# Patient Record
Sex: FEMALE | Race: WHITE | NOT HISPANIC OR LATINO | URBAN - NONMETROPOLITAN AREA
[De-identification: names, ages, dates, MRNs, and addresses within clinical notes are randomized per-mention and may not be internally consistent; named-entity substitution may affect disease eponyms.]

---

## 2024-09-20 ENCOUNTER — ANESTHESIA (OUTPATIENT)
Dept: GASTROENTEROLOGY | Facility: HOSPITAL | Age: 44
End: 2024-09-20
Payer: COMMERCIAL

## 2024-09-20 ENCOUNTER — ANESTHESIA EVENT (OUTPATIENT)
Dept: GASTROENTEROLOGY | Facility: HOSPITAL | Age: 44
End: 2024-09-20
Payer: COMMERCIAL

## 2024-09-20 ENCOUNTER — HOSPITAL ENCOUNTER (OUTPATIENT)
Dept: GASTROENTEROLOGY | Facility: HOSPITAL | Age: 44
Setting detail: OUTPATIENT SURGERY
End: 2024-09-20
Attending: HOSPITALIST
Payer: COMMERCIAL

## 2024-09-20 VITALS
TEMPERATURE: 97.2 F | OXYGEN SATURATION: 100 % | DIASTOLIC BLOOD PRESSURE: 65 MMHG | HEART RATE: 45 BPM | RESPIRATION RATE: 22 BRPM | SYSTOLIC BLOOD PRESSURE: 98 MMHG

## 2024-09-20 DIAGNOSIS — Z12.11 ENCOUNTER FOR SCREENING FOR MALIGNANT NEOPLASM OF COLON: ICD-10-CM

## 2024-09-20 RX ORDER — LIDOCAINE HYDROCHLORIDE 10 MG/ML
INJECTION, SOLUTION EPIDURAL; INFILTRATION; INTRACAUDAL; PERINEURAL AS NEEDED
Status: DISCONTINUED | OUTPATIENT
Start: 2024-09-20 | End: 2024-09-20

## 2024-09-20 RX ORDER — SODIUM CHLORIDE, SODIUM LACTATE, POTASSIUM CHLORIDE, CALCIUM CHLORIDE 600; 310; 30; 20 MG/100ML; MG/100ML; MG/100ML; MG/100ML
INJECTION, SOLUTION INTRAVENOUS CONTINUOUS PRN
Status: DISCONTINUED | OUTPATIENT
Start: 2024-09-20 | End: 2024-09-20

## 2024-09-20 RX ORDER — PROPOFOL 10 MG/ML
INJECTION, EMULSION INTRAVENOUS AS NEEDED
Status: DISCONTINUED | OUTPATIENT
Start: 2024-09-20 | End: 2024-09-20

## 2024-09-20 RX ADMIN — LIDOCAINE HYDROCHLORIDE 50 MG: 10 INJECTION, SOLUTION EPIDURAL; INFILTRATION; INTRACAUDAL; PERINEURAL at 08:04

## 2024-09-20 RX ADMIN — PROPOFOL 20 MG: 10 INJECTION, EMULSION INTRAVENOUS at 08:09

## 2024-09-20 RX ADMIN — PROPOFOL 70 MG: 10 INJECTION, EMULSION INTRAVENOUS at 08:04

## 2024-09-20 RX ADMIN — SODIUM CHLORIDE, SODIUM LACTATE, POTASSIUM CHLORIDE, AND CALCIUM CHLORIDE: .6; .31; .03; .02 INJECTION, SOLUTION INTRAVENOUS at 08:00

## 2024-09-20 RX ADMIN — PROPOFOL 120 MCG/KG/MIN: 10 INJECTION, EMULSION INTRAVENOUS at 08:06

## 2024-09-20 NOTE — ANESTHESIA PREPROCEDURE EVALUATION
Procedure:  COLONOSCOPY    Relevant Problems   No relevant active problems        Physical Exam    Airway    Mallampati score: II  TM Distance: >3 FB  Neck ROM: full     Dental   No notable dental hx     Cardiovascular      Pulmonary      Other Findings  post-pubertal.      Anesthesia Plan  ASA Score- 1     Anesthesia Type- IV sedation with anesthesia with ASA Monitors.         Additional Monitors:     Airway Plan:            Plan Factors-Exercise tolerance (METS): >4 METS.    Chart reviewed.        Patient is not a current smoker.              Induction-     Postoperative Plan-         Informed Consent- Anesthetic plan and risks discussed with patient.  I personally reviewed this patient with the CRNA. Discussed and agreed on the Anesthesia Plan with the CRNA..

## 2024-09-20 NOTE — ANESTHESIA POSTPROCEDURE EVALUATION
Post-Op Assessment Note    CV Status:  Stable  Pain Score: 0    Pain management: adequate       Mental Status:  Alert and awake   Hydration Status:  Stable   PONV Controlled:  Controlled   Airway Patency:  Patent     Post Op Vitals Reviewed: Yes    No anethesia notable event occurred.    Staff: CRNA               BP   95/55   Temp   97.9   Pulse  51   Resp   14   SpO2   100

## 2024-09-20 NOTE — H&P
Procedure(s):  Colonoscopy with indication(s) of CRC screening        Vitals:    09/20/24 0729   BP: 99/57   Pulse: (!) 48   Resp: 16   Temp: (!) 97.3 °F (36.3 °C)   SpO2: 100%       Physical Exam:  Physical Exam  Eyes:      Conjunctiva/sclera: Conjunctivae normal.   Cardiovascular:      Rate and Rhythm: Normal rate.   Pulmonary:      Effort: Pulmonary effort is normal.   Abdominal:      Palpations: Abdomen is soft.   Neurological:      General: No focal deficit present.      Mental Status: She is alert.   Psychiatric:         Mood and Affect: Mood normal.              Endoscopy Pre-Procedure Assessment:  Prior to the procedure, the patient is identified.  The patient's history, medications, and allergies have been reviewed.  The patient is competent.     Consent:    We have discussed the procedure in detail. We reviewed risks, benefits and alternative as well as potentional complications including and not limited to medication side effect , infection, bleeding, perforation and the potential need for surgery, ICU admission, CPR, as well as the need for blood product transfusion. Patient verbalized understanding and agreement. All patient questions were answered.     After reviewed the risks and benefits, the patient is deemed in satisfactory condition to undergo the procedure.  The anesthesia plan is to use monitored anesthesia care (MAC).      09/20/24

## 2025-02-03 ENCOUNTER — OFFICE VISIT (OUTPATIENT)
Dept: OBGYN CLINIC | Facility: CLINIC | Age: 45
End: 2025-02-03
Payer: COMMERCIAL

## 2025-02-03 VITALS
DIASTOLIC BLOOD PRESSURE: 64 MMHG | SYSTOLIC BLOOD PRESSURE: 90 MMHG | HEART RATE: 62 BPM | WEIGHT: 89.6 LBS | BODY MASS INDEX: 16.93 KG/M2

## 2025-02-03 DIAGNOSIS — M81.0 OSTEOPOROSIS OF MULTIPLE SITES: ICD-10-CM

## 2025-02-03 DIAGNOSIS — Z01.419 ENCNTR FOR GYN EXAM (GENERAL) (ROUTINE) W/O ABN FINDINGS: Primary | ICD-10-CM

## 2025-02-03 DIAGNOSIS — Z86.19 HISTORY OF HPV INFECTION: ICD-10-CM

## 2025-02-03 DIAGNOSIS — Z00.00 WELLNESS EXAMINATION: ICD-10-CM

## 2025-02-03 DIAGNOSIS — Z12.4 CERVICAL CANCER SCREENING: ICD-10-CM

## 2025-02-03 DIAGNOSIS — N91.2 AMENORRHEA: ICD-10-CM

## 2025-02-03 DIAGNOSIS — Z87.42 H/O ABNORMAL CERVICAL PAPANICOLAOU SMEAR: ICD-10-CM

## 2025-02-03 DIAGNOSIS — Z12.31 ENCOUNTER FOR SCREENING MAMMOGRAM FOR BREAST CANCER: ICD-10-CM

## 2025-02-03 PROCEDURE — S0610 ANNUAL GYNECOLOGICAL EXAMINA: HCPCS | Performed by: OBSTETRICS & GYNECOLOGY

## 2025-02-03 PROCEDURE — G0145 SCR C/V CYTO,THINLAYER,RESCR: HCPCS | Performed by: OBSTETRICS & GYNECOLOGY

## 2025-02-03 PROCEDURE — G0476 HPV COMBO ASSAY CA SCREEN: HCPCS | Performed by: OBSTETRICS & GYNECOLOGY

## 2025-02-03 NOTE — PROGRESS NOTES
Assessment        Diagnoses and all orders for this visit:    Encntr for gyn exam (general) (routine) w/o abn findings    Cervical cancer screening  -     Liquid-based pap, screening    H/O abnormal cervical Papanicolaou smear  -     Liquid-based pap, screening    Osteoporosis of multiple sites  -     Ambulatory Referral to Endocrinology; Future  -     PTH, intact; Future  -     Comprehensive metabolic panel; Future  -     Vitamin D 25 hydroxy; Future    History of HPV infection  -     Liquid-based pap, screening    Encounter for screening mammogram for breast cancer  -     Mammo screening bilateral w 3d and cad; Future    Amenorrhea  -     Follicle stimulating hormone; Future  -     Prolactin; Future  -     hCG, quantitative; Future  -     Estradiol; Future  -     TSH, 3rd generation with Free T4 reflex; Future    Wellness examination  -     Ambulatory Referral to Family Practice; Future             Plan      All questions answered.  Await pap smear results.  Diagnosis explained in detail, including differential.  Discussed healthy lifestyle modifications.  Educational material distributed.  Follow up in 1 year.  Follow up as needed.  Mammogram.   Pap Smear performed due to history of HPV Pap  Referred to endocrinology due to early diagnosis of osteoporosis  Patient ordered blood work, intact PTH, CMP and vitamin D was ordered  Referred to family practice to establish care  Patient with amenorrhea and repeat labs are ordered including FSH, prolactin, estradiol and TSH      Subjective      Rima Bajwa is a 44 y.o. female who presents for annual exam.      Chief Complaint   Patient presents with    new Pt. annual     Diagnosed with Osteoporosis. Pt. has questions     She is a new patient  Last appt last January 2024 Dr. Yenifer Hensley/[facility]  Last year, had an abnormal pap 2 years ago  Previously had a colposcopy  H/o HPV positive other    1/31/23 negative colposcopy    Last period 2 years  FSH was  normal last year    Patient with issued with getting pregnant  Sheis allergic to milk, patient had short stature  Was told she needed hormone drops for osteoporosis    She is not having hot flashes  She has no vaginal dryness  Had an US at some point   No family h/o early menopause      No recent blood work  Does have a PCP      Last Pap: normal 24 negative HPV  Last mammogram: 2024  Colorectal cancer screening: Not on file 2024 , due in 10 years  DEXA: osteoporosis 24 , multiple sites    HPV vaccine completed:no   Current contraception: vasectomy  History of abnormal Pap smear: yes - HPV positive other   History of abnormal mammogram: no  Family history of uterine or ovarian cancer: no  Family history of breast cancer: no  Family history of colon cancer: no    OB History    Para Term  AB Living   3 1 0 1 2 1   SAB IAB Ectopic Multiple Live Births   1 0 1 0 1      # Outcome Date GA Lbr John/2nd Weight Sex Type Anes PTL Lv   3   32w3d  1588 g (3 lb 8 oz) M Vag-Spont EPI  RONALD   2 SAB            1 Ectopic                Menstrual History:  OB History          3    Para   1    Term   0       1    AB   2    Living   1         SAB   1    IAB   0    Ectopic   1    Multiple   0    Live Births   1                Menarche age: 11  No LMP recorded. Patient is perimenopausal.       Social History     Substance and Sexual Activity   Sexual Activity Not on file          No past medical history on file.  Past Surgical History:   Procedure Laterality Date    DILATION AND CURETTAGE OF UTERUS      ECTOPIC PREGNANCY SURGERY      WISDOM TOOTH EXTRACTION       Family History   Problem Relation Age of Onset    Alzheimer's disease Mother        Social History     Tobacco Use    Smoking status: Never    Smokeless tobacco: Never   Vaping Use    Vaping status: Never Used   Substance Use Topics    Alcohol use: Yes     Comment: social    Drug use: Never          Current Outpatient  Medications:     Calcium Carb-Cholecalciferol (CALCIUM 500 + D PO), Take by mouth, Disp: , Rfl:     No Known Allergies        Review of Systems   Constitutional: Negative.    HENT: Negative.     Eyes: Negative.    Respiratory: Negative.     Cardiovascular: Negative.    Gastrointestinal: Negative.    Endocrine: Negative.    Genitourinary:         As noted in HPI   Musculoskeletal: Negative.    Skin: Negative.    Allergic/Immunologic: Negative.    Neurological: Negative.    Hematological: Negative.    Psychiatric/Behavioral: Negative.         BP 90/64 (BP Location: Right arm, Patient Position: Sitting, Cuff Size: Standard)   Pulse 62   Wt 40.6 kg (89 lb 9.6 oz)   BMI 16.93 kg/m²         Physical Exam  Constitutional:       Appearance: She is well-developed.   Genitourinary:      Vulva, bladder and rectum normal.      No lesions in the vagina.      Genitourinary Comments:         Right Labia: No rash, tenderness, lesions, skin changes or Bartholin's cyst.     Left Labia: No tenderness, lesions, skin changes, Bartholin's cyst or rash.     No inguinal adenopathy present in the right or left side.     No vaginal discharge, tenderness or bleeding.      No vaginal prolapse present.     No vaginal atrophy present.       Right Adnexa: not tender, not full and no mass present.     Left Adnexa: not tender, not full and no mass present.     No cervical motion tenderness, friability, lesion or polyp.      Uterus is not enlarged or tender.      Pelvic exam was performed with patient in the lithotomy position.   Rectum:      No external hemorrhoid.   Breasts:     Right: No mass, nipple discharge, skin change or tenderness.      Left: No mass, nipple discharge, skin change or tenderness.   HENT:      Head: Normocephalic.      Nose: Nose normal.   Eyes:      Conjunctiva/sclera: Conjunctivae normal.   Neck:      Thyroid: No thyromegaly.   Cardiovascular:      Rate and Rhythm: Normal rate and regular rhythm.      Heart sounds:  Normal heart sounds. No murmur heard.  Pulmonary:      Effort: Pulmonary effort is normal. No respiratory distress.      Breath sounds: Normal breath sounds. No wheezing or rales.   Abdominal:      General: There is no distension.      Palpations: Abdomen is soft. There is no mass.      Tenderness: There is no abdominal tenderness. There is no guarding or rebound.   Musculoskeletal:         General: No tenderness.      Cervical back: Neck supple. No muscular tenderness.   Lymphadenopathy:      Cervical: No cervical adenopathy.      Lower Body: No right inguinal adenopathy. No left inguinal adenopathy.   Neurological:      Mental Status: She is alert and oriented to person, place, and time.   Skin:     General: Skin is warm and dry.   Psychiatric:         Mood and Affect: Mood normal.         Behavior: Behavior normal.   Vitals and nursing note reviewed. Exam conducted with a chaperone present.           No future appointments.

## 2025-02-04 LAB
HPV HR 12 DNA CVX QL NAA+PROBE: NEGATIVE
HPV16 DNA CVX QL NAA+PROBE: NEGATIVE
HPV18 DNA CVX QL NAA+PROBE: NEGATIVE

## 2025-02-05 ENCOUNTER — RESULTS FOLLOW-UP (OUTPATIENT)
Dept: LABOR AND DELIVERY | Facility: HOSPITAL | Age: 45
End: 2025-02-05

## 2025-02-05 NOTE — TELEPHONE ENCOUNTER
Patient called back, notified of providers note HPV. Patient verbalzied understanding, thankful for call. Patient will be going for lab work this week.

## 2025-02-06 LAB
LAB AP GYN PRIMARY INTERPRETATION: NORMAL
Lab: NORMAL

## 2025-02-11 ENCOUNTER — APPOINTMENT (OUTPATIENT)
Dept: LAB | Facility: CLINIC | Age: 45
End: 2025-02-11
Payer: COMMERCIAL

## 2025-02-11 DIAGNOSIS — N91.2 AMENORRHEA: ICD-10-CM

## 2025-02-11 DIAGNOSIS — M81.0 OSTEOPOROSIS OF MULTIPLE SITES: ICD-10-CM

## 2025-02-11 LAB
25(OH)D3 SERPL-MCNC: 40.9 NG/ML (ref 30–100)
ALBUMIN SERPL BCG-MCNC: 4.3 G/DL (ref 3.5–5)
ALP SERPL-CCNC: 60 U/L (ref 34–104)
ALT SERPL W P-5'-P-CCNC: 23 U/L (ref 7–52)
ANION GAP SERPL CALCULATED.3IONS-SCNC: 8 MMOL/L (ref 4–13)
AST SERPL W P-5'-P-CCNC: 28 U/L (ref 13–39)
B-HCG SERPL-ACNC: <0.6 MIU/ML (ref 0–5)
BILIRUB SERPL-MCNC: 1.2 MG/DL (ref 0.2–1)
BUN SERPL-MCNC: 13 MG/DL (ref 5–25)
CALCIUM SERPL-MCNC: 9.4 MG/DL (ref 8.4–10.2)
CHLORIDE SERPL-SCNC: 100 MMOL/L (ref 96–108)
CO2 SERPL-SCNC: 31 MMOL/L (ref 21–32)
CREAT SERPL-MCNC: 0.62 MG/DL (ref 0.6–1.3)
ESTRADIOL SERPL-MCNC: 36.7 PG/ML
FSH SERPL-ACNC: 3.3 MIU/ML
GFR SERPL CREATININE-BSD FRML MDRD: 109 ML/MIN/1.73SQ M
GLUCOSE P FAST SERPL-MCNC: 86 MG/DL (ref 65–99)
POTASSIUM SERPL-SCNC: 4 MMOL/L (ref 3.5–5.3)
PROLACTIN SERPL-MCNC: 9.6 NG/ML (ref 3.34–26.72)
PROT SERPL-MCNC: 6.4 G/DL (ref 6.4–8.4)
PTH-INTACT SERPL-MCNC: 25.7 PG/ML (ref 12–88)
SODIUM SERPL-SCNC: 139 MMOL/L (ref 135–147)
TSH SERPL DL<=0.05 MIU/L-ACNC: 1.54 UIU/ML (ref 0.45–4.5)

## 2025-02-11 PROCEDURE — 84146 ASSAY OF PROLACTIN: CPT

## 2025-02-11 PROCEDURE — 82670 ASSAY OF TOTAL ESTRADIOL: CPT

## 2025-02-11 PROCEDURE — 80053 COMPREHEN METABOLIC PANEL: CPT

## 2025-02-11 PROCEDURE — 83970 ASSAY OF PARATHORMONE: CPT

## 2025-02-11 PROCEDURE — 83001 ASSAY OF GONADOTROPIN (FSH): CPT

## 2025-02-11 PROCEDURE — 84443 ASSAY THYROID STIM HORMONE: CPT

## 2025-02-11 PROCEDURE — 36415 COLL VENOUS BLD VENIPUNCTURE: CPT

## 2025-02-11 PROCEDURE — 82306 VITAMIN D 25 HYDROXY: CPT

## 2025-02-11 PROCEDURE — 84702 CHORIONIC GONADOTROPIN TEST: CPT

## 2025-03-27 ENCOUNTER — CONSULT (OUTPATIENT)
Dept: ENDOCRINOLOGY | Facility: CLINIC | Age: 45
End: 2025-03-27
Payer: COMMERCIAL

## 2025-03-27 VITALS
TEMPERATURE: 98 F | BODY MASS INDEX: 16.8 KG/M2 | HEIGHT: 61 IN | DIASTOLIC BLOOD PRESSURE: 70 MMHG | WEIGHT: 89 LBS | OXYGEN SATURATION: 98 % | HEART RATE: 56 BPM | SYSTOLIC BLOOD PRESSURE: 98 MMHG

## 2025-03-27 DIAGNOSIS — R63.6 UNDERWEIGHT: ICD-10-CM

## 2025-03-27 DIAGNOSIS — M81.0 OSTEOPOROSIS OF MULTIPLE SITES: Primary | ICD-10-CM

## 2025-03-27 PROCEDURE — 99244 OFF/OP CNSLTJ NEW/EST MOD 40: CPT | Performed by: STUDENT IN AN ORGANIZED HEALTH CARE EDUCATION/TRAINING PROGRAM

## 2025-03-27 NOTE — PATIENT INSTRUCTIONS
INSTRUCTIONS FOR 24 HOUR URINE COLLECTION     The purpose of this test is to measure the total amount of either hormones or minerals that your body excretes into your urine within a 24 hour period.  To do this, you need to collect all the urine that your body makes for 24 hours.     1.        On the first day, when you wake up, note the time.  Then go to the bathroom and urinate.  This should be flushed down the toilet.  It is not part of the collection.     2.        On the first day, all subsequent urine voids need to be saved in the urine jug.  Save the entire amount of each urine void.     3.        When you go to sleep that night, if you happen to awaken throughout the night and early morning, those urine voids must also be saved.     4.        On the second morning, awaken at the same time as you did on the first morning and go to the bathroom and urinate.  Keep this entire urine void.  This is the final part of the collection.       5.        After you have finished the urine collection, keep it cool until you bring it into the laboratory.      Osteoporosis Education   Osteoporosis  is a long-term medical condition that causes your bones to become weak, brittle, and more likely to fracture. Osteoporosis occurs when your body absorbs more bone than it makes. It is also caused by a lack of calcium and estrogen (female hormone).  Common symptoms include the following:  You may not have any signs or symptoms. You may break a bone after a muscle strain, bump, or fall. A break usually occurs in the hip, spine, or wrist. A collapsed vertebra (bone in your spine) may cause severe back pain or loss of height from bent posture.  Call your doctor if:    You have severe pain.   You have increasing pain after a fall.   You have pain when you do your daily activities.   You have questions or concerns about your condition or care.  Diagnosis of osteoporosis:   Blood and urine tests  measure your calcium, vitamin D, and  estrogen levels.    An x-ray or CT may show thinned bones or a fracture. You may be given contrast liquid to help the bones show up better in the pictures. Tell the healthcare provider if you have ever had an allergic reaction to contrast liquid. Do not enter the MRI room with anything metal. Metal can cause serious injury. Tell the healthcare provider if you have any metal in or on your body.    A bone density test  compares your bone thickness with what is expected for someone of your age, gender, and ethnicity.  Treatment for osteoporosis may include medicines to prevent bone loss, build new bone, and increase estrogen. These medicines help prevent fractures and may be given as a pill or injection. Ask your healthcare provider for more information on these medicines.  Prevent bone loss:  Eat healthy foods that are high in calcium.  This helps keep your bones strong. Good sources of calcium are milk, cheese, broccoli, tofu, almonds, and canned salmon and sardines. Recommended to get at least 1200mg daily of calcium.  Increase your vitamin D intake.  Vitamin D is in fish oils, some vegetables, and fortified milk, cereal, and bread. Vitamin D is also formed in the skin when it is exposed to the sun. Ask your healthcare provider how much sunlight is safe for you. You will require at least 800 units of vitamin D daily taken as a supplement.  Drink liquids as directed.  Ask your healthcare provider how much liquid to drink each day and which liquids are best for you. Do not have alcohol or caffeine. They decrease bone mineral density, which can weaken your bones.  Exercise regularly.  Ask your healthcare provider about the best exercise plan for you. Weight bearing exercise for 30 minutes, 3 times a week can help build and strengthen bone.  Do not smoke.  Nicotine and other chemicals in cigarettes and cigars can cause lung damage. Ask your healthcare provider for information if you currently smoke and need help to  quit. E-cigarettes or smokeless tobacco still contain nicotine. Talk to your healthcare provider before you use these products.  Go to physical therapy as directed.  A physical therapist teaches you exercises to help improve movement and muscle strength.  Alcohol. It is recommended to avoid heavy alcohol use as increased consumption of alcohol is known to cause bone loss  © Copyright [employer] 2021 Information is for End User's use only and may not be sold, redistributed or otherwise used for commercial purposes. All illustrations and images included in CareNotes® are the copyrighted property of [employer] or [employer]
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
## 2025-03-27 NOTE — ASSESSMENT & PLAN NOTE
Her T-score is -3.2, indicating severe osteoporosis, particularly in the lumbar spine. 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 Vitamin D levels are sufficient. Reproductive labs show low estradiol and FSH levels, suggesting potential hypogonadism. Given her history of irregular periods, she may have experienced significant loss of estrogenic effects on bone health. Weight-bearing activities such as resistance training are encouraged to build musculature around the bones for stability and fall prevention. A referral to a nutritionist was discussed with goal for addressing caloric imbalance. A 24-hour urine collection will be ordered to exclude potential etiologies for calcium loss or wasting, including cortisol levels. Additional labs will be requested to rule out other causes of bone density loss, including celiac disease. If any questions arise before the next appointment, she is advised to contact the office.    Follow-up  The patient will follow up in 1 month.      Orders:  •  Ambulatory Referral to Endocrinology  •  Cortisol, Free, Urine, 24 Hour  •  Creatinine, urine, 24 hour  •  Calcium, urine, 24 hour  •  CBC and differential  •  Comprehensive metabolic panel  •  C-Telopeptide  •  Procollagen Type I Intact N Terminal Propeptide  •  Celiac Panel/Adult  •  Protein electrophoresis, serum  •  Phosphorus  •  Protein electrophoresis, urine (UPEP)

## 2025-04-03 ENCOUNTER — TELEPHONE (OUTPATIENT)
Age: 45
End: 2025-04-03

## 2025-04-03 NOTE — TELEPHONE ENCOUNTER
Patient asking if 24 hour urine should be refrigerated. Made aware yes keep in the refrigerator until taking to lab. Patient verbalized understanding.

## 2025-04-05 LAB
ALBUMIN SERPL-MCNC: 4.5 G/DL (ref 3.6–5.1)
ALBUMIN/GLOB SERPL: 2.4 (CALC) (ref 1–2.5)
ALP SERPL-CCNC: 56 U/L (ref 31–125)
ALT SERPL-CCNC: 27 U/L (ref 6–29)
AST SERPL-CCNC: 26 U/L (ref 10–35)
BASOPHILS # BLD AUTO: 10 CELLS/UL (ref 0–200)
BASOPHILS NFR BLD AUTO: 0.4 %
BILIRUB SERPL-MCNC: 1.1 MG/DL (ref 0.2–1.2)
BUN SERPL-MCNC: 17 MG/DL (ref 7–25)
BUN/CREAT SERPL: NORMAL (CALC) (ref 6–22)
CALCIUM SERPL-MCNC: 9.2 MG/DL (ref 8.6–10.2)
CHLORIDE SERPL-SCNC: 107 MMOL/L (ref 98–110)
CO2 SERPL-SCNC: 30 MMOL/L (ref 20–32)
CREAT 24H UR-MRATE: 0.68 G/24 H (ref 0.5–2.15)
CREAT SERPL-MCNC: 0.68 MG/DL (ref 0.5–0.99)
EOSINOPHIL # BLD AUTO: 41 CELLS/UL (ref 15–500)
EOSINOPHIL NFR BLD AUTO: 1.7 %
ERYTHROCYTE [DISTWIDTH] IN BLOOD BY AUTOMATED COUNT: 12 % (ref 11–15)
GFR/BSA.PRED SERPLBLD CYS-BASED-ARV: 109 ML/MIN/1.73M2
GLOBULIN SER CALC-MCNC: 1.9 G/DL (CALC) (ref 1.9–3.7)
GLUCOSE SERPL-MCNC: 81 MG/DL (ref 65–99)
HCT VFR BLD AUTO: 35.4 % (ref 35–45)
HGB BLD-MCNC: 12.3 G/DL (ref 11.7–15.5)
IGA SERPL-MCNC: 183 MG/DL (ref 47–310)
IGA SERPL-MCNC: 184 MG/DL (ref 47–310)
LYMPHOCYTES # BLD AUTO: 794 CELLS/UL (ref 850–3900)
LYMPHOCYTES NFR BLD AUTO: 33.1 %
MCH RBC QN AUTO: 33.9 PG (ref 27–33)
MCHC RBC AUTO-ENTMCNC: 34.7 G/DL (ref 32–36)
MCV RBC AUTO: 97.5 FL (ref 80–100)
MONOCYTES # BLD AUTO: 221 CELLS/UL (ref 200–950)
MONOCYTES NFR BLD AUTO: 9.2 %
NEUTROPHILS # BLD AUTO: 1334 CELLS/UL (ref 1500–7800)
NEUTROPHILS NFR BLD AUTO: 55.6 %
PHOSPHATE SERPL-MCNC: 3.8 MG/DL (ref 2.5–4.5)
PLATELET # BLD AUTO: 145 THOUSAND/UL (ref 140–400)
PMV BLD REES-ECKER: 11.4 FL (ref 7.5–12.5)
POTASSIUM SERPL-SCNC: 4.7 MMOL/L (ref 3.5–5.3)
PROT 24H UR-MRATE: NORMAL MG/24 H
PROT SERPL-MCNC: 6.4 G/DL (ref 6.1–8.1)
PROT SERPL-MCNC: 6.4 G/DL (ref 6.1–8.1)
PROT/CREAT 24H UR: NORMAL MG/G CREAT
PROT/CREAT 24H UR: NORMAL MG/MG CREAT
RBC # BLD AUTO: 3.63 MILLION/UL (ref 3.8–5.1)
SODIUM SERPL-SCNC: 139 MMOL/L (ref 135–146)
TTG IGA SER-ACNC: <1 U/ML
TTG IGA SER-ACNC: <1 U/ML
WBC # BLD AUTO: 2.4 THOUSAND/UL (ref 3.8–10.8)

## 2025-04-08 ENCOUNTER — RESULTS FOLLOW-UP (OUTPATIENT)
Dept: ENDOCRINOLOGY | Facility: CLINIC | Age: 45
End: 2025-04-08

## 2025-04-08 DIAGNOSIS — E24.9 HYPERCORTISOLISM (HCC): Primary | ICD-10-CM

## 2025-04-09 LAB
ALBUMIN ?TM MFR UR ELPH: 0 %
ALBUMIN SERPL ELPH-MCNC: 4.3 G/DL (ref 3.8–4.8)
ALBUMIN SERPL-MCNC: 4.5 G/DL (ref 3.6–5.1)
ALBUMIN/GLOB SERPL: 2.4 (CALC) (ref 1–2.5)
ALP SERPL-CCNC: 56 U/L (ref 31–125)
ALPHA1 GLOB ?TM MFR UR ELPH: 0 %
ALPHA1 GLOB SERPL ELPH-MCNC: 0.2 G/DL (ref 0.2–0.3)
ALPHA2 GLOB ?TM MFR UR ELPH: 0 %
ALPHA2 GLOB SERPL ELPH-MCNC: 0.5 G/DL (ref 0.5–0.9)
ALT SERPL-CCNC: 27 U/L (ref 6–29)
AST SERPL-CCNC: 26 U/L (ref 10–35)
B-GLOBULIN ?TM MFR UR ELPH: 0 %
BASOPHILS # BLD AUTO: 10 CELLS/UL (ref 0–200)
BASOPHILS NFR BLD AUTO: 0.4 %
BETA1 GLOB SERPL ELPH-MCNC: 0.4 G/DL (ref 0.4–0.6)
BETA2 GLOB SERPL ELPH-MCNC: 0.3 G/DL (ref 0.2–0.5)
BILIRUB SERPL-MCNC: 1.1 MG/DL (ref 0.2–1.2)
BUN SERPL-MCNC: 17 MG/DL (ref 7–25)
BUN/CREAT SERPL: NORMAL (CALC) (ref 6–22)
CALCIUM 24H UR-MCNC: 276 MG/24 H (ref 35–250)
CALCIUM PRE 500 MG CA PO UR-SCNC: 10.6 MG/DL
CALCIUM SERPL-MCNC: 9.2 MG/DL (ref 8.6–10.2)
CHLORIDE SERPL-SCNC: 107 MMOL/L (ref 98–110)
CO2 SERPL-SCNC: 30 MMOL/L (ref 20–32)
COLLAGEN CTX SERPL-MCNC: 485 PG/ML (ref 50–465)
CORTIS F 24H UR-MRATE: 61.8 MCG/24 H (ref 4–50)
CORTIS F/CREAT 24H UR: 92.3 MCG/G CREAT
CREAT 24H UR-MRATE: 0.67 G/24 H (ref 0.5–2.15)
CREAT 24H UR-MRATE: 0.68 G/24 H (ref 0.5–2.15)
CREAT SERPL-MCNC: 0.68 MG/DL (ref 0.5–0.99)
EOSINOPHIL # BLD AUTO: 41 CELLS/UL (ref 15–500)
EOSINOPHIL NFR BLD AUTO: 1.7 %
ERYTHROCYTE [DISTWIDTH] IN BLOOD BY AUTOMATED COUNT: 12 % (ref 11–15)
GAMMA GLOB ?TM MFR UR ELPH: 0 %
GAMMA GLOB SERPL ELPH-MCNC: 0.8 G/DL (ref 0.8–1.7)
GFR/BSA.PRED SERPLBLD CYS-BASED-ARV: 109 ML/MIN/1.73M2
GLOBULIN SER CALC-MCNC: 1.9 G/DL (CALC) (ref 1.9–3.7)
GLUCOSE SERPL-MCNC: 81 MG/DL (ref 65–99)
HCT VFR BLD AUTO: 35.4 % (ref 35–45)
HGB BLD-MCNC: 12.3 G/DL (ref 11.7–15.5)
IGA SERPL-MCNC: 183 MG/DL (ref 47–310)
LYMPHOCYTES # BLD AUTO: 794 CELLS/UL (ref 850–3900)
LYMPHOCYTES NFR BLD AUTO: 33.1 %
MCH RBC QN AUTO: 33.9 PG (ref 27–33)
MCHC RBC AUTO-ENTMCNC: 34.7 G/DL (ref 32–36)
MCV RBC AUTO: 97.5 FL (ref 80–100)
MONOCYTES # BLD AUTO: 221 CELLS/UL (ref 200–950)
MONOCYTES NFR BLD AUTO: 9.2 %
NEUTROPHILS # BLD AUTO: 1334 CELLS/UL (ref 1500–7800)
NEUTROPHILS NFR BLD AUTO: 55.6 %
PHOSPHATE SERPL-MCNC: 3.8 MG/DL (ref 2.5–4.5)
PINP SER-MCNC: 49 MCG/L (ref 22–104)
PLATELET # BLD AUTO: 145 THOUSAND/UL (ref 140–400)
PMV BLD REES-ECKER: 11.4 FL (ref 7.5–12.5)
POTASSIUM SERPL-SCNC: 4.7 MMOL/L (ref 3.5–5.3)
PROT 24H UR-MRATE: NORMAL MG/24 H
PROT SERPL-MCNC: 6.4 G/DL (ref 6.1–8.1)
PROT SERPL-MCNC: 6.4 G/DL (ref 6.1–8.1)
PROT/CREAT 24H UR: NORMAL MG/G CREAT
PROT/CREAT 24H UR: NORMAL MG/MG CREAT
RBC # BLD AUTO: 3.63 MILLION/UL (ref 3.8–5.1)
SL AMB INTERPRETATION: NORMAL
SODIUM SERPL-SCNC: 139 MMOL/L (ref 135–146)
SPECIMEN VOL 24H UR: 2600 ML
SPECIMEN VOL 24H UR: 2600 ML
TTG IGA SER-ACNC: <1 U/ML
WBC # BLD AUTO: 2.4 THOUSAND/UL (ref 3.8–10.8)

## 2025-04-11 RX ORDER — DEXAMETHASONE 1 MG
1 TABLET ORAL ONCE
Qty: 1 TABLET | Refills: 0 | Status: SHIPPED | OUTPATIENT
Start: 2025-04-11 | End: 2025-04-11

## 2025-05-08 ENCOUNTER — OFFICE VISIT (OUTPATIENT)
Dept: ENDOCRINOLOGY | Facility: CLINIC | Age: 45
End: 2025-05-08
Payer: COMMERCIAL

## 2025-05-08 VITALS
TEMPERATURE: 97 F | HEIGHT: 61 IN | WEIGHT: 87.2 LBS | BODY MASS INDEX: 16.46 KG/M2 | SYSTOLIC BLOOD PRESSURE: 90 MMHG | OXYGEN SATURATION: 99 % | HEART RATE: 69 BPM | DIASTOLIC BLOOD PRESSURE: 70 MMHG

## 2025-05-08 DIAGNOSIS — M81.0 OSTEOPOROSIS OF MULTIPLE SITES: Primary | ICD-10-CM

## 2025-05-08 DIAGNOSIS — R63.6 UNDERWEIGHT: ICD-10-CM

## 2025-05-08 DIAGNOSIS — E24.9 HYPERCORTISOLISM (HCC): ICD-10-CM

## 2025-05-08 DIAGNOSIS — D70.9 NEUTROPENIA, UNSPECIFIED TYPE (HCC): ICD-10-CM

## 2025-05-08 PROCEDURE — 99214 OFFICE O/P EST MOD 30 MIN: CPT | Performed by: STUDENT IN AN ORGANIZED HEALTH CARE EDUCATION/TRAINING PROGRAM

## 2025-05-08 RX ORDER — DEXAMETHASONE 1 MG
1 TABLET ORAL ONCE
Qty: 1 TABLET | Refills: 0 | Status: SHIPPED | OUTPATIENT
Start: 2025-05-08 | End: 2025-05-08

## 2025-05-08 NOTE — ASSESSMENT & PLAN NOTE
Osteoporosis by DXA in the lumbar spine with a T-score of -3.2.  CBC indicated slightly low white and red cell counts, possibly due to vegetarian diet; rest of metabolic panel normal; no evidence of myeloma.  24-hour urine test showed high cortisol levels and elevated CTX peptide levels, suggesting possible pathologic hypercortisolism or physiological response to increased calorie mismatch from underweight.  Dexamethasone suppression test prescribed to rule out pathologic hypercortisolism; patient to take dexamethasone tablet before bed and have cortisol levels checked the next morning at [facility]. I will follow up with patients results and additional recommendations. In absence of clear organic pathology, may otherwise plan initial lifestyle intervention focusing on nutritional balance, consider supportive services such as nutrition.  Orders:  •  Cortisol Suppression Test  •  Dexamethasone

## 2025-05-08 NOTE — PROGRESS NOTES
Name: Rima Bajwa      : 1980      MRN: [id number]  Encounter Provider: Ruel Samaniego DO  Encounter Date: 2025   Encounter department: [facility]    No chief complaint on file.  :  Assessment & Plan  Osteoporosis of multiple sites  Osteoporosis by DXA in the lumbar spine with a T-score of -3.2.  CBC indicated slightly low white and red cell counts, possibly due to vegetarian diet; rest of metabolic panel normal; no evidence of myeloma.  24-hour urine test showed high cortisol levels and elevated CTX peptide levels, suggesting possible pathologic hypercortisolism or physiological response to increased calorie mismatch from underweight.  Dexamethasone suppression test prescribed to rule out pathologic hypercortisolism; patient to take dexamethasone tablet before bed and have cortisol levels checked the next morning at [facility]. I will follow up with patients results and additional recommendations. In absence of clear organic pathology, may otherwise plan initial lifestyle intervention focusing on nutritional balance, consider supportive services such as nutrition.  Orders:  •  Cortisol Suppression Test  •  Dexamethasone    Neutropenia, unspecified type (HCC)         Hypercortisolism (HCC)    Orders:  •  dexamethasone (DECADRON) 1 mg tablet; Take 1 tablet (1 mg total) by mouth 1 (one) time for 1 dose Take 1 mg dexamethasone at 11PM the evening prior to obtaining 8AM labwork    Underweight             History of Present Illness   History of Present Illness  The patient is a 44-year-old female here today for a follow-up of osteoporosis and to review her lab results.    She has been adhering to a vegetarian diet and reports no significant health concerns. A DEXA scan revealed significant osteoporosis in the lumbar spine with a T-score of -3.2. Previous lab results indicated slightly low populations of white and red blood cells, which she  "recalls might have been attributed to her vegetarian diet. She does not remember if both cell counts were low or just one. There was a concern about potential leukemia in the past, but it was later attributed to her dietary habits.    She reports feeling well overall and does not exhibit symptoms typically associated with anemia, despite being surprised by the possibility of anemia due to her diet. She expresses apprehension towards medication use and prefers to avoid it unless absolutely necessary. She is open to the possibility of vitamin supplementation if deemed necessary.      Pertinent Medical History       Review of Systems as per [facility]       Medical History Reviewed by provider this encounter:  Tobacco  Allergies  Meds  Problems  Med Hx  Surg Hx  Fam Hx     .    Objective   BP 90/70 (BP Location: Left arm, Patient Position: Sitting)   Pulse 69   Temp (!) 97 °F (36.1 °C)   Ht 5' 1\" (1.549 m)   Wt 39.6 kg (87 lb 3.2 oz)   SpO2 99%   BMI 16.48 kg/m²      Body mass index is 16.48 kg/m².  Wt Readings from Last 3 Encounters:   05/08/25 39.6 kg (87 lb 3.2 oz)   03/27/25 40.4 kg (89 lb)   02/03/25 40.6 kg (89 lb 9.6 oz)     Physical Exam  Vitals reviewed.   Constitutional:       General: She is not in acute distress.     Appearance: Normal appearance. She is underweight.   HENT:      Head: Normocephalic and atraumatic.   Eyes:      General: No scleral icterus.     Conjunctiva/sclera: Conjunctivae normal.   Pulmonary:      Effort: Pulmonary effort is normal. No respiratory distress.   Musculoskeletal:         General: No deformity.      Cervical back: Normal range of motion.   Neurological:      General: No focal deficit present.      Mental Status: She is alert.   Psychiatric:         Mood and Affect: Mood normal.         Behavior: Behavior normal.       Physical Exam      Results  Labs:   CBC showed slightly low populations of white cells and red cells. Metabolic panel was normal. High cortisol levels " "detected. CTX peptide levels were elevated.    Imaging:  DEXA scan showed significant osteoporosis in the lumbar spine with a T-score of -3.2.    Diagnostic testing:   No evidence of myeloma.  Labs:   No results found for: \"HGBA1C\"  Lab Results   Component Value Date    CREATININE 0.68 04/04/2025    CREATININE 0.62 02/11/2025    BUN 17 04/04/2025    K 4.7 04/04/2025     04/04/2025    CO2 30 04/04/2025     eGFR   Date Value Ref Range Status   04/04/2025 109 > OR = 60 mL/min/1.73m2 Final   02/11/2025 109 ml/min/1.73sq m Final     No results found for: \"CHOL\", \"HDL\", \"LDL\", \"TRIG\", \"CHOLHDL\"  Lab Results   Component Value Date    ALT 27 04/04/2025    AST 26 04/04/2025    ALKPHOS 56 04/04/2025     Lab Results   Component Value Date    VCQ9TGUGCCPB 1.537 02/11/2025     Component      Latest Ref Rng 4/4/2025   White Blood Cell Count      3.8 - 10.8 Thousand/uL 2.4 (L)    Red Blood Cell Count      3.80 - 5.10 Million/uL 3.63 (L)    Hemoglobin      11.7 - 15.5 g/dL 12.3    HCT      35.0 - 45.0 % 35.4    MCV      80.0 - 100.0 fL 97.5    MCH      27.0 - 33.0 pg 33.9 (H)    MCHC.      32.0 - 36.0 g/dL 34.7    RDW      11.0 - 15.0 % 12.0    Platelet Count      140 - 400 Thousand/uL 145    SL AMB MPV      7.5 - 12.5 fL 11.4    Neutrophils (Absolute)      1,500 - 7,800 cells/uL 1,334 (L)    Lymphocytes (Absolute)      850 - 3,900 cells/uL 794 (L)    Monocytes (Absolute)      200 - 950 cells/uL 221    Eosinophils (Absolute)      15 - 500 cells/uL 41    Basophils ABS      0 - 200 cells/uL 10    Neutrophils      % 55.6    Lymphocytes      % 33.1    Monocytes      % 9.2    Eosinophils      % 1.7    Basophils PCT      % 0.4    GLUCOSE      65 - 99 mg/dL 81    BUN      7 - 25 mg/dL 17    Creatinine      0.50 - 0.99 mg/dL 0.68    GFR, Calculated      > OR = 60 mL/min/1.73m2 109    SL AMB BUN/CREATININE RATIO      6 - 22 (calc) SEE NOTE:    Sodium      135 - 146 mmol/L 139    Potassium      3.5 - 5.3 mmol/L 4.7    Chloride      98 " "- 110 mmol/L 107    Carbon Dioxide      20 - 32 mmol/L 30    Calcium      8.6 - 10.2 mg/dL 9.2    Total Protein      6.1 - 8.1 g/dL 6.4    Total Protein      6.1 - 8.1 g/dL 6.4    Albumin      3.6 - 5.1 g/dL 4.5    Albumin      % 0    Globulin      1.9 - 3.7 g/dL (calc) 1.9    Albumin/Globulin Ratio      1.0 - 2.5 (calc) 2.4    Total Bilirubin      0.2 - 1.2 mg/dL 1.1    ALK PHOS      31 - 125 U/L 56    AST      10 - 35 U/L 26    ALT      6 - 29 U/L 27    Creatinine, 24H Ur      0.50 - 2.15 g/24 h 0.68    Creatinine, 24H Ur      0.50 - 2.15 g/24 h 0.67    Protein/Creatinine Ratio      <150 mg/g creat NOTE    Protein/Creatinine Ratio      <0.150 mg/mg creat NOTE    PROTEIN 24 HOUR URINE      <150 mg/24 h NOTE    Alpha-1 Globulin %      % 0    Alpha-2-Globulins      % 0    Beta Globulins      % 0    Gamma Globulins      % 0    Interpretation --    Interpretation --    Interpretation Normal Pattern    Albumin %      3.8 - 4.8 g/dL 4.3    Alpha-1 Globulin      0.2 - 0.3 g/dL 0.2    Alpha-2 Globulin %      0.5 - 0.9 g/dL 0.5    Beta 1 Globulin      0.4 - 0.6 g/dL 0.4    Beta 2 Globulin      0.2 - 0.5 g/dL 0.3    Gamma Globulin %      0.8 - 1.7 g/dL 0.8    24 HR URINE VOLUME      mL 2,600    24 HR URINE VOLUME      mL 2,600    CORTISOL 24H URINARY FREE      4.0 - 50.0 mcg/24 h 61.8 (H)    Cortisol, Free, Urine      mcg/g creat 92.3 (H)    CALCIUM URINE      mg/dL 10.6    CALCIUM 24 HOUR URINE      35 - 250 mg/24 h 276 (H)    TTG IGA      U/mL <1.0    IGA      47 - 310 mg/dL 183    C Telopeptide      50 - 465 pg/mL 485 (H)    Procollagen Type I Intact N Terminal Propeptide      22 - 104 mcg/L 49    Phosphorus      2.5 - 4.5 mg/dL 3.8       Legend:  (L) Low  (H) High    No results found for: \"FREET4\", \"TSI\"    There are no Patient Instructions on file for this visit.    Discussed with the patient and all questioned fully answered. She will call me if any problems arise.      "

## 2025-05-20 ENCOUNTER — TELEPHONE (OUTPATIENT)
Age: 45
End: 2025-05-20

## 2025-05-20 NOTE — TELEPHONE ENCOUNTER
Patient called stating she took dexamethasone at 10pm and went to the lab at 7am for labs but there were no orders. Call placed to office and instructed she can go back to lab for lab draw. Patient verbalized understanding. Lab orders faxed to   [facility] [phone]. Patient verbalized understanding.

## 2025-05-21 ENCOUNTER — RESULTS FOLLOW-UP (OUTPATIENT)
Dept: OTHER | Facility: HOSPITAL | Age: 45
End: 2025-05-21

## 2025-05-21 LAB — CORTIS 8H P 1 MG DEX SERPL-MCNC: 1.4 MCG/DL

## 2025-05-30 LAB
CORTIS 8H P 1 MG DEX SERPL-MCNC: 1.4 MCG/DL
DEXAMETHASONE SERPL-MCNC: 201 NG/DL

## 2025-07-15 ENCOUNTER — HOSPITAL ENCOUNTER (OUTPATIENT)
Age: 45
Discharge: HOME | End: 2025-07-15
Payer: COMMERCIAL

## 2025-07-15 VITALS — BODY MASS INDEX: 16.99 KG/M2 | WEIGHT: 90 LBS | HEIGHT: 61 IN

## 2025-07-15 DIAGNOSIS — Z12.31 ENCOUNTER FOR SCREENING MAMMOGRAM FOR BREAST CANCER: ICD-10-CM

## 2025-07-15 PROCEDURE — 77063 BREAST TOMOSYNTHESIS BI: CPT

## 2025-07-15 PROCEDURE — 77067 SCR MAMMO BI INCL CAD: CPT
